# Patient Record
Sex: FEMALE | Race: WHITE
[De-identification: names, ages, dates, MRNs, and addresses within clinical notes are randomized per-mention and may not be internally consistent; named-entity substitution may affect disease eponyms.]

---

## 2017-10-06 ENCOUNTER — HOSPITAL ENCOUNTER (EMERGENCY)
Dept: HOSPITAL 62 - ER | Age: 52
Discharge: HOME | End: 2017-10-06
Payer: COMMERCIAL

## 2017-10-06 VITALS — DIASTOLIC BLOOD PRESSURE: 65 MMHG | SYSTOLIC BLOOD PRESSURE: 142 MMHG

## 2017-10-06 DIAGNOSIS — R11.2: ICD-10-CM

## 2017-10-06 DIAGNOSIS — Z79.899: ICD-10-CM

## 2017-10-06 DIAGNOSIS — L25.9: Primary | ICD-10-CM

## 2017-10-06 DIAGNOSIS — R42: ICD-10-CM

## 2017-10-06 PROCEDURE — 99283 EMERGENCY DEPT VISIT LOW MDM: CPT

## 2017-10-06 NOTE — ER DOCUMENT REPORT
ED Dizziness/Weakness





- General


Chief Complaint: Dizziness


Stated Complaint: NAUSEA AND VOMITING


Time Seen by Provider: 10/06/17 11:34


Mode of Arrival: Medic


Information source: Patient


Notes: 





This 51-year-old female patient comes emergency room complaining of severe 

dizziness that started a little while ago.  She has been worked up for vertigo 

including neurology evaluation.  She has been told she has crystals in her ears 

that break loose.  I suspect they were diagnosing coper lifts in the 

semicircular canals.  Diagnosis was based on what sounds like a tilt test type 

maneuver.


She states that when the symptoms come on suddenly, it is usually after she has 

done something such as bend over and then straighten back up.  It is usually a 

fairly dramatic change in the position of her head.


She reports the most recent exacerbation started while at work, she did not 

have any the medication she took previously for the problem.  She has also 

developed a fairly pronounced rash on both cheeks since arriving here, the only 

thing she can think of is placing a wet paper towel or washcloth on her face 

while at work to try to help with the severe dizziness, lightheadedness and 

nausea.





She did receive Ativan 2 mg p.o., Antivert 25 mg p.o., at triage about 1-1/2 

hours ago.  She reports that it has not helped very much.


TRAVEL OUTSIDE OF THE [place] IN LAST 30 DAYS: No





- Related Data


Allergies/Adverse Reactions: 


 





clindamycin [Clindamycin] Allergy (Severe, Verified 10/06/17 11:22)


 rash swelling


morphine [Morphine] Allergy (Severe, Verified 10/06/17 11:22)


 Hypotension


Penicillins Allergy (Intermediate, Verified 10/06/17 11:22)


 pass out


oxycodone HCl [From Percocet] Adverse Reaction (Severe, Verified 10/06/17 11:22)


 VOMITING


aspirin [Aspirin] Adverse Reaction (Intermediate, Verified 10/06/17 11:22)


 VOMITING


erythromycin base [Erythromycin Base] Adverse Reaction (Intermediate, Verified 

10/06/17 11:22)


 VOMITING








Home Medications: 


 Current Home Medications





Gabapentin [Gabapentin] 300 mg PO TID 10/06/17 [History]


Lisinopril/Hydrochlorothiazide [Lisinopril-Hctz 20-12.5 mg Tab] 1 each PO DAILY 

10/06/17 [History]


Venlafaxine HCl ER [Effexor Xr 75 mg Cap.sr] 150 mg PO DAILY 10/06/17 [History]











Past Medical History





- General


Information source: Patient





- Social History


Smoking Status: Never Smoker


Chew tobacco use (# tins/day): No


Frequency of alcohol use: None


Drug Abuse: None


Occupation: [employer]


Family History: Reviewed & Not Pertinent, DM, Hypertension, Malignancy, Thyroid 

Disfunction, Other


Patient has suicidal ideation: No


Patient has homicidal ideation: No





- Medical History


Notes: 





The patient reports she takes Neurontin for a restless leg type problem, 

Effexor for depression, and lisinopril-hctz 20/12.5 for hypertension.





- Past Medical History


Cardiac Medical History: Reports: Hx Hypertension


Pulmonary Medical History: Reports: None


EENT Medical History: Reports: None


Neurological Medical History: Reports: None


Endocrine Medical History: Reports: None


Renal/ Medical History: Reports: None


GI Medical History: Reports: Hx Gastroesophageal Reflux Disease


Musculoskeltal Medical History: Reports Hx Restless Leg Syndrome


Psychiatric Medical History: Reports: Hx Depression


Infectious Medical History: Reports: None


Past Surgical History: Reports: Hx Cholecystectomy, Hx Orthopedic Surgery - 

left foot, Hx Tonsillectomy





- Immunizations


Immunizations up to date: Yes


Hx Diphtheria, Pertussis, Tetanus Vaccination: Yes


Hx Pneumococcal Vaccination: 12/30/13





Review of Systems





- Review of Systems


Constitutional: No symptoms reported


EENT: See HPI


Cardiovascular: No symptoms reported


Respiratory: No symptoms reported


Gastrointestinal: No symptoms reported


Genitourinary: No symptoms reported


Female Genitourinary: Post menopausal


Musculoskeletal: Other - Restless legs-like syndrome


Skin: No symptoms reported


Hematologic/Lymphatic: No symptoms reported


Neurological/Psychological: See HPI





Physical Exam





- Vital signs


Vitals: 


 











Temp Pulse Resp BP Pulse Ox


 


 97.6 F   91   14   119/81   99 


 


 10/06/17 11:24  10/06/17 11:24  10/06/17 11:24  10/06/17 11:24  10/06/17 11:24











Interpretation: Normal





- General


General appearance: Appears well, Alert





- HEENT


Head: Normocephalic, Atraumatic, Other - There is erythema to the face on both 

cheeks with an almost petechial appearance on the right face.  There may be 

some edema to the tissue.


Eyes: Normal


Extraocular movements intact: Yes - There is right lateral gaze nystagmus


Pupils: PERRL





- Respiratory


Respiratory status: No respiratory distress


Breath sounds: Normal





- Cardiovascular


Rhythm: Regular


Heart sounds: Normal auscultation


Murmur: No





- Abdominal


Inspection: Normal





- Back


Back: Normal





- Extremities


General upper extremity: Normal inspection


General lower extremity: Normal inspection





- Neurological


Neuro grossly intact: Yes


Notes: 





The patient has a right lateral gaze nystagmus.  When she turns her head to the 

left provokes mild symptoms, when she turns her head to the right it provokes 

much more severe symptoms.  Looking about also provokes symptoms.





- Psychological


Associated symptoms: Normal affect, Normal mood





- Skin


Skin Temperature: Warm


Skin Moisture: Dry


Skin Color: Normal





Course





- Re-evaluation


Re-evalutation: 





10/06/17 15:30


The patient reports that the dizziness is a little better since the second dose 

of Antivert.  We will give her a third dose.  The facial rash appears about the 

same, perhaps a little worse on the left and earlier.


10/06/17 16:48


The patient's symptoms are a little bit better after the third dose of 

Antivert.  She still can provoke the symptoms turning her head to the right, 

but much less so turning her head to the left.





- Vital Signs


Vital signs: 


 











Temp Pulse Resp BP Pulse Ox


 


 97.6 F   91   19   117/76   98 


 


 10/06/17 11:24  10/06/17 11:24  10/06/17 16:14  10/06/17 16:14  10/06/17 16:14














Discharge





- Discharge


Clinical Impression: 


 Vertigo





Contact dermatitis


Qualifiers:


 Contact dermatitis type: unspecified Contact dermatitis trigger: unspecified 

trigger Qualified Code(s): L25.9 - Unspecified contact dermatitis, unspecified 

cause





Condition: Stable


Disposition: HOME, SELF-CARE


Additional Instructions: 


Vertigo





     You have experienced an episode of vertigo -- a whirling dizziness which 

may be accompanied by nausea and vomiting or staggering.  Vertigo is often 

caused by an irritation of the inner ear, in which case it is called 

labyrinthitis.  It can also be a symptom of a degenerating inner ear, nerve 

damage, or brain injury.  Your physician has evaluated you to determine whether 

any further testing is necessary.


     Vertigo is often treated with dramamine or meclizine.  These medications 

are helpful, but stronger medication may be needed if you are vomiting.  Rest 

in bed.  You should not drive or operate machinery until completely better.  It 

may take one to three weeks for recovery.


     If there are new symptoms, such as decreased hearing or vision, severe 

headache, weakness or faintness, or confusion, call the physician.











Take the Antivert as prescribed to help suppress your vertigo symptoms.


Massage the triamcinolone cream into the affected area on your face 3 times 

daily.


Try to remain in the upright or semi-recumbent position to avoid further 

stimulation of your inner ear.


Follow-up with your doctor Monday for reevaluation and possible ear nose and 

throat referral.





RETURN TO THE EMERGENCY ROOM IF ANY NEW OR WORSENING SYMPTOMS.








Prescriptions: 


Meclizine HCl [Antivert 25 mg Tablet] 25 mg PO TID PRN #30 tablet


 PRN Reason: 


Referrals: 


PAM DEWITT MD [Primary Care Provider] - 10/09/17

## 2017-10-06 NOTE — ER DOCUMENT REPORT
ED Medical Screen (RME)





- General


Chief Complaint: Dizziness


Stated Complaint: NAUSEA AND VOMITING


Time Seen by Provider: 10/06/17 11:34


Mode of Arrival: Medic


Information source: Patient, Relative


Notes: 





51-year-old female history of vertigo has been seen multiple times for similar 

dizziness in the past presents with complaints of the room spinning.  Patient 

notes her last episode was 2 weeks ago








I have greeted and performed a rapid initial assessment of this patient.  A 

comprehensive ED assessment and evaluation of the patient, analysis of test 

results and completion of the medical decision making process will be conducted 

by additional ED providers.





PHYSICAL EXAMINATION:





GENERAL: Well-appearing holding her head.





HEAD: Atraumatic, normocephalic.





EYES: Pupils equal round extraocular movements intact,  conjunctiva are normal.





ENT: Nares patent





NECK: Normal range of motion





LUNGS: No respiratory distress





Musculoskeletal: Normal range of motion





NEUROLOGICAL: admits to vertigo 





PSYCH: Normal mood, normal affect.





SKIN: Warm, Dry, normal turgor, no rashes or lesions noted.


TRAVEL OUTSIDE OF THE [place] IN LAST 30 DAYS: No





- Related Data


Allergies/Adverse Reactions: 


 





clindamycin [Clindamycin] Allergy (Severe, Verified 10/06/17 11:22)


 rash swelling


morphine [Morphine] Allergy (Severe, Verified 10/06/17 11:22)


 Hypotension


Penicillins Allergy (Intermediate, Verified 10/06/17 11:22)


 pass out


oxycodone HCl [From Percocet] Adverse Reaction (Severe, Verified 10/06/17 11:22)


 VOMITING


aspirin [Aspirin] Adverse Reaction (Intermediate, Verified 10/06/17 11:22)


 VOMITING


erythromycin base [Erythromycin Base] Adverse Reaction (Intermediate, Verified 

10/06/17 11:22)


 VOMITING








Home Medications: 


 Current Home Medications





Gabapentin [Gabapentin] 300 mg PO TID 10/06/17 [History]


Lisinopril/Hydrochlorothiazide [Lisinopril-Hctz 20-12.5 mg Tab] 1 each PO DAILY 

10/06/17 [History]


Venlafaxine HCl ER [Effexor Xr 75 mg Cap.sr] 150 mg PO DAILY 10/06/17 [History]











Past Medical History





- Social History


Chew tobacco use (# tins/day): No


Frequency of alcohol use: None


Drug Abuse: None





- Past Medical History


Cardiac Medical History: Reports: Hx Hypertension


   Denies: Hx Coronary Artery Disease, Hx Heart Attack


Pulmonary Medical History: 


   Denies: Hx Asthma, Hx Bronchitis, Hx COPD, Hx Pneumonia, Hx Tuberculosis


Neurological Medical History: Denies: Hx Cerebrovascular Accident, Hx Seizures


Endocrine Medical History: Reports: Hx Diabetes Mellitus Type 2


Renal/ Medical History: Denies: Hx Peritoneal Dialysis


GI Medical History: Reports: Hx Gastroesophageal Reflux Disease.  Denies: Hx 

Hepatitis, Hx Hiatal Hernia, Hx Ulcer


Musculoskeltal Medical History: Denies Hx Arthritis


Psychiatric Medical History: Reports: Hx Depression


Infectious Medical History: Denies: Hx Hepatitis


Past Surgical History: Reports: Hx Cholecystectomy, Hx Orthopedic Surgery - 

left foot, Hx Tonsillectomy.  Denies: Hx Hysterectomy, Hx Mastectomy, Hx Open 

Heart Surgery, Hx Pacemaker





- Immunizations


Immunizations up to date: Yes


Hx Diphtheria, Pertussis, Tetanus Vaccination: Yes


History of Influenza Vaccine for 10/2017 - 3/2018 Season: No





Physical Exam





- Vital signs


Vitals: 





 











Temp Pulse Resp BP Pulse Ox


 


 97.6 F   91   14   119/81   99 


 


 10/06/17 11:24  10/06/17 11:24  10/06/17 11:24  10/06/17 11:24  10/06/17 11:24














Course





- Vital Signs


Vital signs: 





 











Temp Pulse Resp BP Pulse Ox


 


 97.6 F   91   14   119/81   99 


 


 10/06/17 11:24  10/06/17 11:24  10/06/17 11:24  10/06/17 11:24  10/06/17 11:24

## 2018-02-10 ENCOUNTER — HOSPITAL ENCOUNTER (EMERGENCY)
Dept: HOSPITAL 62 - ER | Age: 53
Discharge: HOME | End: 2018-02-10
Payer: COMMERCIAL

## 2018-02-10 VITALS — DIASTOLIC BLOOD PRESSURE: 78 MMHG | SYSTOLIC BLOOD PRESSURE: 139 MMHG

## 2018-02-10 DIAGNOSIS — E11.9: ICD-10-CM

## 2018-02-10 DIAGNOSIS — Z90.49: ICD-10-CM

## 2018-02-10 DIAGNOSIS — L30.9: Primary | ICD-10-CM

## 2018-02-10 DIAGNOSIS — I10: ICD-10-CM

## 2018-02-10 PROCEDURE — 99283 EMERGENCY DEPT VISIT LOW MDM: CPT

## 2018-02-10 NOTE — ER DOCUMENT REPORT
HPI





- HPI


Patient complains to provider of: skin lesion


Onset: Other - 2 months


Onset/Duration: Persistent


Pain Level: 4


Context: 





Patient states that she had a skin lesion frozen 2 months ago.  Patient states 

since then she is had continued irritation with peeling skin to this area.  

Patient does acknowledge that she frequently will scratch and pick at the skin.

  Patient states she has been applying bandages but is concerned she might be 

having a reaction to the adhesive.  She states area seems to be more reddened 

than normal.  Patient denies any fever.


Exacerbated by: Denies


Relieved by: Denies


Similar symptoms previously: Yes


Recently seen / treated by doctor: No





- ROS


ROS below otherwise negative: Yes


Systems Reviewed and Negative: Yes All other systems reviewed and negative





- CONSTITUTIONAL


Constitutional: DENIES: Fever, Chills





- REPRODUCTIVE


Reproductive: DENIES: Pregnant:





- DERM


Skin Problems: Rash


Notes: 





Skin lesion to right forearm





Past Medical History





- General


Information source: Patient





- Social History


Smoking Status: Unknown if Ever Smoked


Chew tobacco use (# tins/day): No


Frequency of alcohol use: None


Drug Abuse: None


Occupation: [employer]


Family History: Reviewed & Not Pertinent, DM, Hypertension, Malignancy, Thyroid 

Disfunction, Other


Patient has suicidal ideation: No


Patient has homicidal ideation: No





- Past Medical History


Cardiac Medical History: Reports: Hx Hypertension


   Denies: Hx Coronary Artery Disease, Hx Heart Attack


Pulmonary Medical History: 


   Denies: Hx Asthma, Hx Bronchitis, Hx COPD, Hx Pneumonia, Hx Tuberculosis


Neurological Medical History: Denies: Hx Cerebrovascular Accident, Hx Seizures


Endocrine Medical History: Reports: Hx Diabetes Mellitus Type 2


Renal/ Medical History: Denies: Hx Peritoneal Dialysis


GI Medical History: Reports: Hx Gastroesophageal Reflux Disease.  Denies: Hx 

Hepatitis, Hx Hiatal Hernia, Hx Ulcer


Musculoskeltal Medical History: Denies Hx Arthritis


Psychiatric Medical History: Reports: Hx Depression


Infectious Medical History: Denies: Hx Hepatitis


Past Surgical History: Reports: Hx Cholecystectomy, Hx Orthopedic Surgery - 

left foot, Hx Tonsillectomy.  Denies: Hx Hysterectomy, Hx Mastectomy, Hx Open 

Heart Surgery, Hx Pacemaker





- Immunizations


Immunizations up to date: Yes


Hx Diphtheria, Pertussis, Tetanus Vaccination: Yes


Hx Pneumococcal Vaccination: 12/30/13





Vertical Provider Document





- CONSTITUTIONAL


Agree With Documented VS: Yes


Exam Limitations: No Limitations


General Appearance: WD/WN, No Apparent Distress


Notes: 





Patient continually scratching and picking at various lesions on bilateral arms





- INFECTION CONTROL


TRAVEL OUTSIDE OF THE [place] IN LAST 30 DAYS: No





- HEENT


HEENT: Atraumatic, Normocephalic





- NECK


Neck: Normal Inspection





- RESPIRATORY


Respiratory: No Respiratory Distress


O2 Sat by Pulse Oximetry: 98





- CARDIOVASCULAR


Pulses: Normal: Radial





- BACK


Back: Normal Inspection





- MUSCULOSKELETAL/EXTREMETIES


Musculoskeletal/Extremeties: MAEW





- NEURO


Level of Consciousness: Awake, Alert, Appropriate


Motor/Sensory: No Motor Deficit





- DERM


Integumentary: Warm, Dry, Rash - Erythematous macular rash in a distinctly 

rectangular pattern overlying the central scaling lesion to dorsal aspect of 

right wrist.  Area concerning for possible contact dermatitis due to the 

dressing or the ointment used on the dressing.  Mild erythema noted to central 

scaling lesion.





Course





- Re-evaluation


Re-evalutation: 





02/10/18 12:39


Patient incessantly picking at skin.  Patient encouraged to avoid scratching at 

the skin to avoid using any type of adhesive as it appears she is having a 

sensitivity to the adhesive.





- Vital Signs


Vital signs: 


 











Temp Pulse Resp BP Pulse Ox


 


 98.0 F   84   16   139/78 H  98 


 


 02/10/18 12:12  02/10/18 12:12  02/10/18 12:12  02/10/18 12:12  02/10/18 12:12














Discharge





- Discharge


Clinical Impression: 


 Skin lesion, Dermatitis





Condition: Stable


Disposition: HOME, SELF-CARE


Instructions:  Bactroban Ointment ([facility])


Additional Instructions: 


Return immediately for any new or worsening symptoms





Followup with your primary care provider, call tomorrow to make a followup 

appointment





Follow-up with a dermatologist for further evaluation





Avoid using tape or adhesives to the wound to event skin sensitivity





Avoid scratching and picking at the skin


Prescriptions: 


Mupirocin [Bactroban 2% Ointment 22 gm] 1 applic TP TID #22 gm


Referrals: 


PAM DEWITT MD [NO LOCAL MD] - 02/12/18

## 2018-03-08 ENCOUNTER — HOSPITAL ENCOUNTER (OUTPATIENT)
Dept: HOSPITAL 62 - WI | Age: 53
End: 2018-03-08
Attending: ADVANCED PRACTICE MIDWIFE
Payer: COMMERCIAL

## 2018-03-08 DIAGNOSIS — Z12.31: Primary | ICD-10-CM

## 2018-03-08 PROCEDURE — 77063 BREAST TOMOSYNTHESIS BI: CPT

## 2018-03-08 PROCEDURE — 77067 SCR MAMMO BI INCL CAD: CPT

## 2018-03-15 NOTE — WOMENS IMAGING REPORT
EXAM DESCRIPTION:  3D SCREENING MAMMO BILAT



COMPLETED DATE/TIME:  3/8/2018 2:13 pm



REASON FOR STUDY:  SCREENING MAMMO Z12.31  ENCNTR SCREEN MAMMOGRAM FOR MALIGNANT NEOPLASM OF DORIS



COMPARISON:  8/2/2016 and 5/14/2014.



TECHNIQUE:  Standard craniocaudal and mediolateral oblique views of each breast recorded using digita
l acquisition and breast tomosynthesis.



LIMITATIONS:  None.



FINDINGS:  No masses, calcifications or architectural distortion. No areas of suspicion.

Read with the assistance of CAD.

.[facility] - R2 Cenova Version 1.3

.[facility] - R2 Cenova Version 1.3

.[facility] - R2 Cenova Version 2.4

[place] [place] 2.4

.[facility] - R2  Version 9.2



IMPRESSION:  NORMAL MAMMOGRAM.  BIRADS 1.



BREAST DENSITY:  b. There are scattered areas of fibroglandular density.



BIRAD:  1 NEGATIVE



RECOMMENDATION:  ROUTINE SCREENING



COMMENT:  The patient has been notified of the results by letter per SA requirements. Additional no
tification policies are in place for contacting patient with suspicious or incomplete findings.

Quality ID #225: The American College of Radiology recommends an annual screening mammogram for women
 aged 40 years or over. This facility utilizes a reminder system to ensure that all patients receive 
reminder letters, and/or direct phone calls for appointments. This includes reminders for routine scr
eening mammograms, diagnostic mammograms, or other Breast Imaging Interventions when appropriate.  Th
is patient will be placed in the appropriate reminder system.

The American College of Radiology (ACR) has developed recommendations for screening MRI of the breast
s in certain patient populations, to be used in conjunction with mammography.  Breast MRI surveillanc
e may be appropriate for women with more than 20% lifetime risk of developing breast cancer  as deter
mined by genetic testing, significant family history of the disease, or history of mantle radiation f
or Hodgkins Disease.  ACR Practice Guidelines 2008.

[employer]



PQRS 6045F: Fluoroscopic imaging is not utilized for breast tomosynthesis.



TECHNICAL DOCUMENTATION:  FINDING NUMBER: (1)

ASSESSMENT:  (1)

JOB ID:  [id number]

 2011 [employer]- All Rights Reserved



Reading location - IP/workstation name: [facility]-RR2